# Patient Record
Sex: MALE | Race: WHITE | NOT HISPANIC OR LATINO | Employment: FULL TIME | URBAN - METROPOLITAN AREA
[De-identification: names, ages, dates, MRNs, and addresses within clinical notes are randomized per-mention and may not be internally consistent; named-entity substitution may affect disease eponyms.]

---

## 2022-01-01 ENCOUNTER — HOSPITAL ENCOUNTER (EMERGENCY)
Facility: HOSPITAL | Age: 52
End: 2022-11-27
Attending: EMERGENCY MEDICINE

## 2022-01-01 DIAGNOSIS — I46.9 CARDIAC ARREST (HCC): Primary | ICD-10-CM

## 2022-01-01 RX ORDER — CALCIUM CHLORIDE 100 MG/ML
SYRINGE (ML) INTRAVENOUS CODE/TRAUMA/SEDATION MEDICATION
Status: COMPLETED | OUTPATIENT
Start: 2022-01-01 | End: 2022-01-01

## 2022-01-01 RX ORDER — EPINEPHRINE 0.1 MG/ML
SYRINGE (ML) INJECTION CODE/TRAUMA/SEDATION MEDICATION
Status: COMPLETED | OUTPATIENT
Start: 2022-01-01 | End: 2022-01-01

## 2022-01-01 RX ORDER — SODIUM BICARBONATE 84 MG/ML
INJECTION, SOLUTION INTRAVENOUS CODE/TRAUMA/SEDATION MEDICATION
Status: COMPLETED | OUTPATIENT
Start: 2022-01-01 | End: 2022-01-01

## 2022-01-01 RX ADMIN — SODIUM BICARBONATE 50 MEQ: 84 INJECTION, SOLUTION INTRAVENOUS at 00:06

## 2022-01-01 RX ADMIN — EPINEPHRINE 0.1 MG: 0.1 INJECTION, SOLUTION ENDOTRACHEAL; INTRACARDIAC; INTRAVENOUS at 00:12

## 2022-01-01 RX ADMIN — EPINEPHRINE 0.1 MG: 0.1 INJECTION, SOLUTION ENDOTRACHEAL; INTRACARDIAC; INTRAVENOUS at 00:05

## 2022-01-01 RX ADMIN — EPINEPHRINE 0.1 MG: 0.1 INJECTION, SOLUTION ENDOTRACHEAL; INTRACARDIAC; INTRAVENOUS at 00:15

## 2022-01-01 RX ADMIN — CALCIUM CHLORIDE 1 G: 100 INJECTION PARENTERAL at 00:13

## 2022-01-01 RX ADMIN — EPINEPHRINE 0.1 MG: 0.1 INJECTION, SOLUTION ENDOTRACHEAL; INTRACARDIAC; INTRAVENOUS at 00:08

## 2022-11-27 NOTE — ED PROVIDER NOTES
History  Chief Complaint   Patient presents with   • Cardiac Arrest     Pt found unresponsive by wife, 15 min compressions PTA     55-year-old male with no known past medical history presenting via EMS in cardiac arrest after collapsing in his home following 2 hours of nausea/vomiting  The rest was unwitnessed wife found him approximately 5-15 minutes after she had last seen him on the ground and started CPR  At the time of EMS arrival police were there and I placed an AED which had advised 1 shock  Medics gave an approximate total of 5 epis and 2-3 shocks  At one point rhythm was V tach  Subsequent rhythms were asystole  ETT and IO were placed in the field  History provided by:  EMS personnel  Cardiac Arrest  Witnessed by:  Not witnessed  Incident location:  Home  Time before BLS initiated:  > 5 minutes  Time before ALS initiated:  > 10 minutes  Condition upon EMS arrival:  Unresponsive  Pulse:  Absent  Treatments prior to arrival:  AED discharged, intubation and ACLS protocol  Rhythm on admission to ED:  Asystole      None       History reviewed  No pertinent past medical history  History reviewed  No pertinent surgical history  History reviewed  No pertinent family history  I have reviewed and agree with the history as documented  E-Cigarette/Vaping     E-Cigarette/Vaping Substances     Social History     Tobacco Use   • Smoking status: Unknown       Review of Systems   Unable to perform ROS: Intubated       Physical Exam  Physical Exam  Constitutional:       Comments: Cyanotic, unresponsive   HENT:      Head: Atraumatic  Mouth/Throat:      Comments: ETT in place  Eyes:      Comments: Pupils fixed and dilated   Cardiovascular:      Comments: Pulseless, no peripheral edema  Pulmonary:      Comments: +breath sounds with BVM bilaterally  Abdominal:      Palpations: Abdomen is soft  Genitourinary:     Penis: Normal     Musculoskeletal:         General: No signs of injury   Normal range of motion  Skin:     Coloration: Skin is pale  Vital Signs  ED Triage Vitals [11/27/22 0018]   Temp Pulse Resp BP SpO2   -- (!) 0 -- -- --      Temp src Heart Rate Source Patient Position - Orthostatic VS BP Location FiO2 (%)   -- -- -- -- --      Pain Score       --           Vitals:    11/27/22 0018   Pulse: (!) 0         Visual Acuity      ED Medications  Medications   EPINEPHrine (ADRENALIN) injection (0 1 mg Intravenous Given 11/27/22 0015)   sodium bicarbonate 50 mEq/50 mL injection (50 mEq Intravenous Given 11/27/22 0006)   calcium chloride 1 g/10 mL injection (1 g Intravenous Given 11/27/22 0013)       Diagnostic Studies  Results Reviewed     None                 No orders to display              Procedures  CriticalCare Time  Performed by: Keyonna Sterling MD  Authorized by: Keyonna Sterling MD     Critical care provider statement:     Critical care time (minutes):  30    Critical care start time:  11/26/2022 11:48 PM    Critical care end time:  11/27/2022 12:18 AM    Critical care time was exclusive of:  Separately billable procedures and treating other patients    Critical care was necessary to treat or prevent imminent or life-threatening deterioration of the following conditions:  Cardiac failure, circulatory failure and respiratory failure    Critical care was time spent personally by me on the following activities:  Obtaining history from patient or surrogate, development of treatment plan with patient or surrogate, evaluation of patient's response to treatment, examination of patient, ordering and performing treatments and interventions and re-evaluation of patient's condition             ED Course      Pt arrived with ACLS in progress  We continued ACLS with 4 rounds of epi, 1 bicarb, 1 CaCl, 1 L of normal saline  Glucose reportedly normal in the field  Endotracheal tube placed in the field appears to be functional with good breath sounds bilaterally with BVM    Patient persistently in asystole despite interventions in the ED  Resuscitative efforts ceased after several rounds of compressions due to futility  TOD 12:18am  Based on history, suspect myocardial infarction as most likely cause of arrest   called and presented to perform exam                           SBIRT 20yo+    Flowsheet Row Most Recent Value   SBIRT (23 yo +)    In order to provide better care to our patients, we are screening all of our patients for alcohol and drug use  Would it be okay to ask you these screening questions? Unable to answer at this time Filed at: 2022 0033                    MDM  Number of Diagnoses or Management Options     Amount and/or Complexity of Data Reviewed  Obtain history from someone other than the patient: yes  Discuss the patient with other providers: yes  Independent visualization of images, tracings, or specimens: yes    Risk of Complications, Morbidity, and/or Mortality  Presenting problems: high  Management options: high    Patient Progress  Patient progress: other (comment)      Disposition  Final diagnoses:   Cardiac arrest [facility])     Time reflects when diagnosis was documented in both MDM as applicable and the Disposition within this note     Time User Action Codes Description Comment    2022  2:07 AM Shasta Mejia Add [I46 9] Cardiac arrest [facility])       ED Disposition     ED Disposition       Condition   --    Date/Time   Sun 2022  2:07 AM    Comment   --         Follow-up Information    None       Date, Time and Cause of Death    Preliminary Cause of Death: Cardiac arrest [facility])       Patient's Medications    No medications on file       No discharge procedures on file      PDMP Review     None          ED Provider  Electronically Signed by           Bob James MD  [id number]